# Patient Record
Sex: MALE | Race: WHITE | URBAN - METROPOLITAN AREA
[De-identification: names, ages, dates, MRNs, and addresses within clinical notes are randomized per-mention and may not be internally consistent; named-entity substitution may affect disease eponyms.]

---

## 2018-01-23 ENCOUNTER — OFFICE VISIT (OUTPATIENT)
Dept: ORTHOPEDIC SURGERY | Age: 28
End: 2018-01-23

## 2018-01-23 VITALS
BODY MASS INDEX: 26.6 KG/M2 | WEIGHT: 190 LBS | DIASTOLIC BLOOD PRESSURE: 81 MMHG | HEIGHT: 71 IN | HEART RATE: 90 BPM | SYSTOLIC BLOOD PRESSURE: 117 MMHG

## 2018-01-23 DIAGNOSIS — M54.50 LUMBAR SPINE PAIN: Primary | ICD-10-CM

## 2018-01-23 DIAGNOSIS — S39.012A STRAIN OF LUMBAR REGION, INITIAL ENCOUNTER: ICD-10-CM

## 2018-01-23 PROCEDURE — 99203 OFFICE O/P NEW LOW 30 MIN: CPT | Performed by: NURSE PRACTITIONER

## 2018-01-23 PROCEDURE — 72100 X-RAY EXAM L-S SPINE 2/3 VWS: CPT | Performed by: NURSE PRACTITIONER

## 2018-01-23 RX ORDER — METHYLPREDNISOLONE 4 MG/1
TABLET ORAL
Qty: 1 KIT | Refills: 0 | Status: SHIPPED | OUTPATIENT
Start: 2018-01-23 | End: 2018-01-29

## 2018-01-23 RX ORDER — CYCLOBENZAPRINE HCL 10 MG
10 TABLET ORAL 3 TIMES DAILY PRN
Qty: 30 TABLET | Refills: 0 | Status: SHIPPED | OUTPATIENT
Start: 2018-01-23 | End: 2018-02-02

## 2018-01-23 RX ORDER — MELOXICAM 7.5 MG/1
7.5 TABLET ORAL DAILY
Qty: 30 TABLET | Refills: 0 | Status: SHIPPED | OUTPATIENT
Start: 2018-01-23

## 2018-01-31 ENCOUNTER — OFFICE VISIT (OUTPATIENT)
Dept: ORTHOPEDIC SURGERY | Age: 28
End: 2018-01-31

## 2018-01-31 VITALS
HEART RATE: 90 BPM | SYSTOLIC BLOOD PRESSURE: 142 MMHG | HEIGHT: 71 IN | DIASTOLIC BLOOD PRESSURE: 88 MMHG | BODY MASS INDEX: 26.6 KG/M2 | WEIGHT: 190.04 LBS

## 2018-01-31 DIAGNOSIS — M51.26 HNP (HERNIATED NUCLEUS PULPOSUS), LUMBAR: Primary | ICD-10-CM

## 2018-01-31 PROCEDURE — G8419 CALC BMI OUT NRM PARAM NOF/U: HCPCS | Performed by: PHYSICIAN ASSISTANT

## 2018-01-31 PROCEDURE — 99243 OFF/OP CNSLTJ NEW/EST LOW 30: CPT | Performed by: PHYSICIAN ASSISTANT

## 2018-01-31 PROCEDURE — G8484 FLU IMMUNIZE NO ADMIN: HCPCS | Performed by: PHYSICIAN ASSISTANT

## 2018-01-31 PROCEDURE — G8427 DOCREV CUR MEDS BY ELIG CLIN: HCPCS | Performed by: PHYSICIAN ASSISTANT

## 2018-01-31 RX ORDER — GABAPENTIN 300 MG/1
300 CAPSULE ORAL 3 TIMES DAILY
Qty: 60 CAPSULE | Refills: 0 | Status: SHIPPED | OUTPATIENT
Start: 2018-01-31 | End: 2018-02-20

## 2018-01-31 NOTE — PROGRESS NOTES
affect. His skin is warm and dry. His gait is slightly antalgic on the right. Back:  He stands with slight lumbar flexion. His lumbar flexion, extension and lateral bending are moderately reduced with pain. He has mild tenderness over his lumbar spine without obvious muscle spasm. The skin over his lumbar spine is normal without a surgical scar. Lower extremities:  He has 5/5 motor strength of bilateral lower extremities. He has a positive straight leg raise on the right. Deep tendon reflexes at knees and achilles are 2+. Sensation is intact to light touch L3 to S1 bilaterally. He has no clonus. Hip range of motion painless. Abdomen:  Non-tender and non-distended. Abdomen is not obese. No rash. Imaging:  I reviewed AP and lateral xray images of his lumbar spine from 1/23/18. They show mild lower lumbar degenerative disc changes at L5S1. No acute fracture or dislocation noted. Assessment:  Lumbar HNP    Plan:  We discussed treatment options including observation, additional oral steroids, physical therapy, epidural injections and additional imaging. I recommend he try physical therapy. He will also try Gabapentin. He will continue working on light duty. If his symptoms fail to improve or worsen, he will call to schedule a lumbar MRI without gadolinium.     Alpa Dunlap PA-C

## 2020-07-28 NOTE — PROGRESS NOTES
Subjective    Chief Complaint   Patient presents with    Back Pain     Patient states that he was lifting heavy garbage cans 2 days ago and he felt a pull in his back. Patient states that since this has happened it has progressively gotten worse. He states that he has lbp and radiates into the right buttock and down right leg. He has some intermittant numbness. Elma Collins is a 32 y.o. female who presents today for evaluation of lower back pain. On 1/21/18 he was picking up a very large and full trash can that typically requires 2 people. As he lifted the garbage can and he felt a pull and pain in his back. He points to the right side of his lower back is the source of his pain. He does have pain and some numbness and tingling going down his right leg depending on his position. He works as a  often in [employer] so he is carrying heavy equipment throughout the day. He attempted to work this morning but was unable to finish working for the day and had difficulty even walking to his car. He denies any prior injury to his back. He denies any incontinence or bowel or bladder changes. Pain Assessment  Location of Pain: Back  Location Modifiers: Right  Severity of Pain: 8  Quality of Pain: Sharp, Throbbing  Duration of Pain: Persistent  Frequency of Pain: Constant  Aggravating Factors: Walking, Standing  Limiting Behavior: Yes  Relieving Factors: Rest  Result of Injury: No  Work-Related Injury: No  Are there other pain locations you wish to document?: No    Patient's medications, allergies, past medical, surgical, social and family histories were reviewed and updated as appropriate.      Physical Exam  Constitutional:  Pt well groomed, no acute distress, well developed, no obvious deformities  Vitals:    01/23/18 1736 01/23/18 1737   BP:  117/81   Pulse:  90   Weight: 190 lb (86.2 kg)    Height: 5' 11\" (1.803 m)      -Oriented to person, place, and time  -mood and affect are appropriate    Lumbar Exam:  he  is well-developed, well-nourished, oriented to person, place, and time. his  mood and affect are appropriate.     -his gait is Antalgic. He arrived to the room in a wheelchair  -Motor strength is 5/5 throughout both lower extremities. -Lumbar range of motion is limited in flexion and extension secondary to pain. -he is Tender to palpation over the lower lumbar vertebrae. -There is decrease of the normal lumbar lordosis secondary to pain. Neurological:  -Deep tendon reflexes at knees symmetric bilaterally  -Blurry, double visionis not noted. -NVI to lower extremities bilaterally. Skin:  No abrasions, lesions, cellulitic changes, obvious deformities noted     Xray: 3 view (AP/Lat/Sacral) of lumbar spine show: No acute fractures or deformities; lessening of the lordotic curvature of the lumbar spine    Assessment: Lumbar strain    Plan: rest the injured area as much as practical.  The patient was given a prescription for a Medrol Dosepak and instructed not to take any other anti-inflammatories while he is taking this medication. He was also given a prescription for Flexeril. He was also given a prescription for meloxicam that he will take after he finishes the Medrol Dosepak. He will use heat on his back intermittently as needed. He was given a note to be off work until he follows up with a physician. He will follow-up with one of our back physicians or PAs in 1 week. We discussed that if the patient's pain increases significantly as well as his numbness or tingling, or if he has changes in his bowel or bladder habits he will present to the ED. No orders of the defined types were placed in this encounter. Most Recent Triglycerides (Optional): 144 (prev 121)